# Patient Record
Sex: FEMALE | Race: WHITE | NOT HISPANIC OR LATINO | URBAN - METROPOLITAN AREA
[De-identification: names, ages, dates, MRNs, and addresses within clinical notes are randomized per-mention and may not be internally consistent; named-entity substitution may affect disease eponyms.]

---

## 2023-03-02 ENCOUNTER — TELEPHONE (OUTPATIENT)
Dept: PSYCHIATRY | Facility: CLINIC | Age: 53
End: 2023-03-02

## 2023-03-05 ENCOUNTER — TELEPHONE (OUTPATIENT)
Dept: BEHAVIORAL/MENTAL HEALTH CLINIC | Facility: CLINIC | Age: 53
End: 2023-03-05

## 2023-03-05 NOTE — TELEPHONE ENCOUNTER
Clinician e-mailed client on Friday 3/3/2023 at 2:05pm, indicating that intake had been trying to connect with her in regards to scheduling  Let client know within e-mail that her request to be transferred had been processed and will be seeing a new clinician that can accommodate her needs  Unable to leave voicemails due to VM box being full

## 2023-03-07 ENCOUNTER — TELEPHONE (OUTPATIENT)
Dept: PSYCHIATRY | Facility: CLINIC | Age: 53
End: 2023-03-07

## 2023-03-08 ENCOUNTER — SOCIAL WORK (OUTPATIENT)
Dept: BEHAVIORAL/MENTAL HEALTH CLINIC | Facility: CLINIC | Age: 53
End: 2023-03-08

## 2023-03-08 DIAGNOSIS — F33.3 MDD (MAJOR DEPRESSIVE DISORDER), RECURRENT, SEVERE, WITH PSYCHOSIS (HCC): Primary | ICD-10-CM
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
## 2023-03-13 ENCOUNTER — TELEPHONE (OUTPATIENT)
Dept: PSYCHIATRY | Facility: CLINIC | Age: 53
End: 2023-03-13

## 2023-04-27 ENCOUNTER — TELEPHONE (OUTPATIENT)
Dept: BEHAVIORAL/MENTAL HEALTH CLINIC | Facility: CLINIC | Age: 53
End: 2023-04-27

## 2023-04-27 NOTE — TELEPHONE ENCOUNTER
Therapist contacted pt to see if she would be attending her session but her mailbox was full so this writer couldn't leave a message

## 2023-05-02 ENCOUNTER — SOCIAL WORK (OUTPATIENT)
Dept: BEHAVIORAL/MENTAL HEALTH CLINIC | Facility: CLINIC | Age: 53
End: 2023-05-02

## 2023-05-02 DIAGNOSIS — F33.3 MAJOR DEPRESSIVE DISORDER, RECURRENT, SEVERE WITH PSYCHOTIC FEATURES (HCC): Primary | ICD-10-CM

## 2023-05-02 NOTE — PSYCH
Behavioral Health Psychotherapy Progress Note    Psychotherapy Provided: Individual Psychotherapy     1  Major depressive [facility], recurrent, [facility]            Goals addressed in session: Goal 1     DATA: Pt Behavioral Health Psychotherapy Progress Note    Psychotherapy Provided: Individual Psychotherapy     1  Major depressive disorder, recurrent, severe with psychotic features ([facility] )            Goals addressed in session: Goal 1     DATA: Pt was 19 minutes late for her session, we were not able to complete treatment plan on this visit because of her stress/anxiety level concerning her family  Pt reported that she is having nightmares, she moved out of her mom's house  Pt reports that her daughter had her baby, mom showed up at the hospital, not speaking to her  Pt reports that she is staying away from parents, feels like mom is trying to replace me with someone  Pt reports that she didn't even hold the baby for 5 minutes, daughter had to have a s-cection  Pt reports that she was at the hospital for 30 minutes before, her daughter  wanted to leave  PT reported that her relationship with mom is toxic, my mother want to take my milestones away from me, getting , having a baby, being a grandmother  Pt reported that she went back to stay with her friend, who doesn't treat her the greatest, but she feels it is a less toxic place then her mother's  Pt reports that she is working at [employer] as a  but is looking to transition to real estate class with [employer]  During this session, this clinician used the following therapeutic modalities: Cognitive Behavioral Therapy    Substance Abuse was not addressed during this session   If the client is diagnosed with a co-occurring substance use disorder, please indicate any changes in the frequency or amount of use: Stage of change for addressing substance use diagnoses: Contemplation    ASSESSMENT:  Manoj Quinn "presents with a Euthymic/ normal and Anxious mood  her affect is Normal range and intensity and Bright, which is congruent, with her mood and the content of the session  The client has made progress on their goals  Melodie Talbot presents with a none risk of suicide, none risk of self-harm, and none risk of harm to others  For any risk assessment that surpasses a \"low\" rating, a safety plan must be developed  A safety plan was indicated: no  If yes, describe in detail     PLAN: Between sessions, Melodie Talbot will continue to sit boundaries with parents, understanding her role as a new grandmother and daughter he next session, the therapist will use Cognitive Behavioral Therapy to address fear of losing contact with her daughter, son-in law, focusing on what she can change, staying in the present, focusing on the hear and now  Therapist will continue to show pt unconditional positive regard and help her to reflect on what is positive in her life  Behavioral Health Treatment Plan and Discharge Planning: Melodie Talbot is aware of and agrees to continue to work on their treatment plan  They have identified and are working toward their discharge goals   yes    Visit start and stop times:    05/02/23  Start Time: 0819  Stop Time: 0850  Total Visit Time: 31 minutes  During this session, this clinician used the following therapeutic modalities: Cognitive Behavioral Therapy      "

## 2023-05-09 ENCOUNTER — SOCIAL WORK (OUTPATIENT)
Dept: BEHAVIORAL/MENTAL HEALTH CLINIC | Facility: CLINIC | Age: 53
End: 2023-05-09

## 2023-05-09 DIAGNOSIS — F33.3 MAJOR DEPRESSIVE DISORDER, RECURRENT, SEVERE WITH PSYCHOTIC FEATURES (HCC): Primary | ICD-10-CM
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
## 2023-05-09 NOTE — BH TREATMENT PLAN
Bella Henderson [address]  1970     Date of Initial Psychotherapy Assessment: 3-08-23  Date of Current Treatment Plan: 05/09/23  Treatment Plan Target Date: 11-  Treatment Plan Expiration Date: 11-    Diagnosis:   1  Major depressive disorder, recurrent severe without psychotic features ([facility] [address] )            Area(s) of Need: Pt reports that she needs to set boundaries with everyone, desires to connect with others [facility]  for group therapy in order to feel supported by others with similar struggles  Pt reported that she would like to work on communication concerns with manager  Pt reports that she struggles with people pleasing and it works against her at work  Long Term Goal 1 (in the client's own words): Pt reports that she desires to be self-sufficent so I don't have to rely on others  Stage of Change: Contemplation    Target Date for completion: 11-     Anticipated therapeutic modalities: Pt reports that she can save atleast 10-15 dollars per paycheck in order to become self-sufficent  Pt will go directly to manager on duty to discuss concerns about work related issues  Pt will set boundaries with others in order to avoid confusion  People identified to complete this goal: Pt      Objective 1: (identify the means of measuring success in meeting the objective): Pt will continue to work part-time until full-time work becomes available  Interventions: Therapist will help pt to focus on setting boundaries with people, set up a schedule to complete tasks at work  Therapist and pt will discuss the importance of staying organized in her thoughts and actions  Therapist ask pt to start saving 10-15 per paycheck  Therapist and pt will talk about staying in the present, focus on the here and now  Therapist will help pt to stay in the present and focus on the here and now          I am currently under the care of a [facility] psychiatric provider: no    My [facility] psychiatric provider is: N/A  I am currently taking psychiatric medications: No    I feel that I will be ready for discharge from mental health care when I reach the following (measurable goal/objective): Pt reports that she needs financial stability, full-time job hopefully in 6 months  For children and adults who have a legal guardian:   Has there been any change to custody orders and/or guardianship status? NA  If yes, attach updated documentation  I have created my Crisis Plan and have been offered a copy of this plan    [address]: Diagnosis and Treatment Plan explained to Анна acknowledges an understanding of their diagnosis  Goodland Flick agrees to this treatment plan      I have been offered a copy of this Treatment Plan  yes

## 2023-05-09 NOTE — BH TREATMENT PLAN
Bella Henderson [address]  1970     Date of Initial Psychotherapy Assessment: 3-08-23  Date of Current Treatment Plan: 05/09/23  Treatment Plan Target Date: 11-  Treatment Plan Expiration Date: 11-    Diagnosis:   No diagnosis found  Area(s) of Need: Pt reports that she needs to set boundaries with everyone, desires to connect with others [facility] for group therapy in order to feel supported by others with similar struggles  Pt reported that she would like to work on communication concerns with manager  Pt reports that she struggles with people pleasing and it works against her at work  Long Term Goal 1 (in the client's own words): Pt reports that she desires to be self-sufficent so I don't have to rely on others  Stage of Change: Contemplation    Target Date for completion: 11-     Anticipated therapeutic modalities: Pt reports that she can save atleast 10-15 dollars per paycheck in order to become self-sufficent  Pt will go directly to manager on duty to discuss concerns about work related issues  Pt will set boundaries with others in order to avoid confusion  People identified to complete this goal: Pt      Objective 1: (identify the means of measuring success in meeting the objective): Pt will continue to work part-time until full-time work becomes available  Interventions: Therapist will help pt to focus on setting boundaries with people, set up a schedule to complete tasks at work  Therapist and pt will discuss the importance of staying organized in her thoughts and actions  Therapist ask pt to start saving 10-15 per paycheck  Therapist and pt will talk about staying in the present, focus on the here and now  Therapist will help pt to stay in the present and focus on the here and now          I am currently under the care of a [facility] psychiatric provider: no    My [facility] psychiatric provider is: N/A  I am currently taking psychiatric medications: No    I feel that I will be ready for discharge from mental health care when I reach the following (measurable goal/objective): Pt reports that she needs financial stability, full-time job hopefully in 6 months  For children and adults who have a legal guardian:   Has there been any change to custody orders and/or guardianship status? NA  If yes, attach updated documentation  I have created my Crisis Plan and have been offered a copy of this plan    [address]: Diagnosis and Treatment Plan explained to Анна acknowledges an understanding of their diagnosis  Mariaa Pro agrees to this treatment plan      I have been offered a copy of this Treatment Plan  yes

## 2023-05-12 ENCOUNTER — TELEPHONE (OUTPATIENT)
Dept: BEHAVIORAL/MENTAL HEALTH CLINIC | Facility: CLINIC | Age: 53
End: 2023-05-12

## 2023-05-12 NOTE — TELEPHONE ENCOUNTER
Therapist contacted pt and tried to leave a voicemail but it was full  Therapist will talk to supervisor about pt and see if she wants to be transferred to another therapist  Pt left a voicemail and said that she wasn't happy with diagnosis that was given her and doesn't think it was accurate  Pt reported that she has sleeping issues and anxiety only

## 2023-06-18 NOTE — TELEPHONE ENCOUNTER
Patient called indicating she needs additional dates with you- she states every other week isn't enough  She states that living with her parents is giving her anxiety  Provided Crisis resources to patient  Informed patient of opening in provider's schedule today at 12  Pt declined due to work schedule      Informed patient that the soonest appt time then would be the appt she has already on [email or web address]  · Gastritis on CT  · PPI IV

## 2023-10-04 ENCOUNTER — DOCUMENTATION (OUTPATIENT)
Dept: BEHAVIORAL/MENTAL HEALTH CLINIC | Facility: CLINIC | Age: 53
End: 2023-10-04

## 2023-10-04 NOTE — PROGRESS NOTES
Psychotherapy Discharge Summary    Preferred Name: Ritchie Quinn  YOB: 1970    Admission date to psychotherapy: 3-8-2023    Referred by: self    Presenting Problem: Depression and Anxiety    Course of treatment included : individual therapy     Progress/Outcome of Treatment Goals (brief summary of course of treatment) pt did not make much progress because she was struggling with some delusional ideation. Pt got frustrated with her diagnosis and refused to accept her treatment goals. Treatment Complications (if any): pt appeared to refuse to accept diagnosis and therapy suggestions. Therapist got clinical coordinator involved who also suggested pt get medication management for psychosis symptoms. Treatment Progress: poor    Current [facility] Psychiatric Provider: N/A    Discharge Medications include: N/A    Discharge Date: 10-    Discharge Diagnosis: No diagnosis found. Criteria for Discharge: Major Depressive Disorder, severe without psychotic features    Aftercare recommendations include (include specific referral names and phone numbers, if appropriate): pt refused to get follow instructions, refused to continue with treatment.     Prognosis: poor